# Patient Record
Sex: FEMALE | Race: WHITE | Employment: OTHER | URBAN - METROPOLITAN AREA
[De-identification: names, ages, dates, MRNs, and addresses within clinical notes are randomized per-mention and may not be internally consistent; named-entity substitution may affect disease eponyms.]

---

## 2017-01-23 ENCOUNTER — LAB ENCOUNTER (OUTPATIENT)
Dept: LAB | Facility: HOSPITAL | Age: 82
End: 2017-01-23
Attending: INTERNAL MEDICINE
Payer: MEDICARE

## 2017-01-23 ENCOUNTER — TELEPHONE (OUTPATIENT)
Dept: GASTROENTEROLOGY | Facility: CLINIC | Age: 82
End: 2017-01-23

## 2017-01-23 ENCOUNTER — OFFICE VISIT (OUTPATIENT)
Dept: GASTROENTEROLOGY | Facility: CLINIC | Age: 82
End: 2017-01-23

## 2017-01-23 VITALS
DIASTOLIC BLOOD PRESSURE: 74 MMHG | WEIGHT: 165.38 LBS | HEIGHT: 67 IN | HEART RATE: 59 BPM | SYSTOLIC BLOOD PRESSURE: 136 MMHG | BODY MASS INDEX: 25.96 KG/M2

## 2017-01-23 DIAGNOSIS — R19.7 DIARRHEA, UNSPECIFIED TYPE: ICD-10-CM

## 2017-01-23 DIAGNOSIS — R19.7 DIARRHEA, UNSPECIFIED TYPE: Primary | ICD-10-CM

## 2017-01-23 LAB
ALBUMIN SERPL BCP-MCNC: 3.8 G/DL (ref 3.5–4.8)
ALBUMIN/GLOB SERPL: 1.1 {RATIO} (ref 1–2)
ALP SERPL-CCNC: 80 U/L (ref 32–100)
ALT SERPL-CCNC: 36 U/L (ref 14–54)
ANION GAP SERPL CALC-SCNC: 11 MMOL/L (ref 0–18)
AST SERPL-CCNC: 51 U/L (ref 15–41)
BASOPHILS # BLD: 0 K/UL (ref 0–0.2)
BASOPHILS NFR BLD: 0 %
BILIRUB SERPL-MCNC: 0.6 MG/DL (ref 0.3–1.2)
BUN SERPL-MCNC: 12 MG/DL (ref 8–20)
BUN/CREAT SERPL: 12.9 (ref 10–20)
CALCIUM SERPL-MCNC: 9.5 MG/DL (ref 8.5–10.5)
CHLORIDE SERPL-SCNC: 106 MMOL/L (ref 95–110)
CO2 SERPL-SCNC: 22 MMOL/L (ref 22–32)
CREAT SERPL-MCNC: 0.93 MG/DL (ref 0.5–1.5)
EOSINOPHIL # BLD: 0.3 K/UL (ref 0–0.7)
EOSINOPHIL NFR BLD: 4 %
ERYTHROCYTE [DISTWIDTH] IN BLOOD BY AUTOMATED COUNT: 13.7 % (ref 11–15)
GLOBULIN PLAS-MCNC: 3.5 G/DL (ref 2.5–3.7)
GLUCOSE SERPL-MCNC: 129 MG/DL (ref 70–99)
HCT VFR BLD AUTO: 41.6 % (ref 35–48)
HGB BLD-MCNC: 13.8 G/DL (ref 12–16)
LYMPHOCYTES # BLD: 2.2 K/UL (ref 1–4)
LYMPHOCYTES NFR BLD: 33 %
MCH RBC QN AUTO: 30.3 PG (ref 27–32)
MCHC RBC AUTO-ENTMCNC: 33.2 G/DL (ref 32–37)
MCV RBC AUTO: 91.3 FL (ref 80–100)
MONOCYTES # BLD: 0.6 K/UL (ref 0–1)
MONOCYTES NFR BLD: 8 %
NEUTROPHILS # BLD AUTO: 3.6 K/UL (ref 1.8–7.7)
NEUTROPHILS NFR BLD: 54 %
OSMOLALITY UR CALC.SUM OF ELEC: 289 MOSM/KG (ref 275–295)
PLATELET # BLD AUTO: 196 K/UL (ref 140–400)
PMV BLD AUTO: 9.4 FL (ref 7.4–10.3)
POTASSIUM SERPL-SCNC: 4.4 MMOL/L (ref 3.3–5.1)
PROT SERPL-MCNC: 7.3 G/DL (ref 5.9–8.4)
RBC # BLD AUTO: 4.56 M/UL (ref 3.7–5.4)
SODIUM SERPL-SCNC: 139 MMOL/L (ref 136–144)
WBC # BLD AUTO: 6.6 K/UL (ref 4–11)

## 2017-01-23 PROCEDURE — 36415 COLL VENOUS BLD VENIPUNCTURE: CPT

## 2017-01-23 PROCEDURE — 80053 COMPREHEN METABOLIC PANEL: CPT

## 2017-01-23 PROCEDURE — 99212 OFFICE O/P EST SF 10 MIN: CPT | Performed by: INTERNAL MEDICINE

## 2017-01-23 PROCEDURE — G0463 HOSPITAL OUTPT CLINIC VISIT: HCPCS | Performed by: INTERNAL MEDICINE

## 2017-01-23 PROCEDURE — 99204 OFFICE O/P NEW MOD 45 MIN: CPT | Performed by: INTERNAL MEDICINE

## 2017-01-23 PROCEDURE — 85025 COMPLETE CBC W/AUTO DIFF WBC: CPT

## 2017-01-23 NOTE — PATIENT INSTRUCTIONS
Diarrhea/loose stools x 1 year  - lab work today  - bland diet, avoid dairy  - call us with your medication list  - Colonoscopy with colyte prep and MAC sedation

## 2017-01-23 NOTE — TELEPHONE ENCOUNTER
Scheduled for:  Colonoscopy 29987  Date:  3/9/17  Location:  [facility]  Sedation:  MAC  Time:  0800  Prep:  Colyte  Meds/Allergies Reconciled?:  Yes  Diagnosis with codes:  Diarrhea R19.7  EM or [facility] notified?:  I notified Mel Murphy in Endo to schedule today  Insur

## 2017-01-26 ENCOUNTER — TELEPHONE (OUTPATIENT)
Dept: GASTROENTEROLOGY | Facility: CLINIC | Age: 82
End: 2017-01-26

## 2017-01-26 NOTE — TELEPHONE ENCOUNTER
----- Message from Dayana Vega MD sent at 1/26/2017  3:29 PM CST -----  Lab work looks ok, glucose slightly elevated. Should have the colonoscopy set up as next step.      RN to mail letter to pt

## 2017-01-27 NOTE — PROGRESS NOTES
Deana Desai is a 80year old female.     HPI:   Patient presents with:  Colonoscopy Screening  Diarrhea      The patient is a 28-year-old female with a history of depression, chronic reflux is been experiencing loose stools and diarrhea for the last ye neurologic or dermatologic symptoms. PHYSICAL EXAM:   Blood pressure 136/74, pulse 59, height 5' 7\" (1.702 m), weight 165 lb 6.4 oz (75.025 kg).     The patient appears their stated age and is in no acute distress  HEENT- anicteric sclera, neck no lymp

## 2017-02-07 ENCOUNTER — TELEPHONE (OUTPATIENT)
Dept: GASTROENTEROLOGY | Facility: CLINIC | Age: 82
End: 2017-02-07

## 2017-02-07 NOTE — TELEPHONE ENCOUNTER
No, pt will continue to follow with you. I left another voice message at [address] [place] [address] regarding records. I also notified daughter that I haven't been able to get them yet--she will try to obtain and bring them by the office. Thanks.

## 2017-02-07 NOTE — TELEPHONE ENCOUNTER
Dr Mojgan Cary, I received an email from [facility], stating pt's daughter Nichelle Rosas had called after hours to cancel pt's colonoscopy Mac/diarrhea on Thurs March 9 at [facility].   Pt's diarrhea became severe, she presented to [facility] ER, and was later admitted and h

## 2017-02-09 NOTE — TELEPHONE ENCOUNTER
I spoke to Lacie Scheuermann at [address] [phone] and faxed a face sheet, requesting colon path/op report, ER note, discharge note from pt inpt admission, labs, diagnostics. Faxed it to her at [phone].  Pt 's daughter Bo Kearns notified that they are sendin

## 2017-02-14 NOTE — TELEPHONE ENCOUNTER
Dr Melanie Andres, please refer below. I received records from [facility] and placed on your desk for review. Thanks.

## 2017-03-12 NOTE — TELEPHONE ENCOUNTER
Records from [facility] hospitalization reviewed, CT scan and colonoscopy negative. Stools negative for infection    Please see how the pt is doing.

## 2017-03-13 NOTE — TELEPHONE ENCOUNTER
I spoke to daughter Bo Kearns, who states that as far as she knows, the diarrhea issue has resolved. I instructed her to please contact us if it reoccurred. Thanks.

## 2018-06-13 ENCOUNTER — APPOINTMENT (OUTPATIENT)
Dept: CT IMAGING | Facility: HOSPITAL | Age: 83
End: 2018-06-13
Attending: EMERGENCY MEDICINE
Payer: MEDICARE

## 2018-06-13 ENCOUNTER — HOSPITAL ENCOUNTER (EMERGENCY)
Facility: HOSPITAL | Age: 83
Discharge: HOME | End: 2018-06-14
Attending: EMERGENCY MEDICINE
Payer: MEDICARE

## 2018-06-13 ENCOUNTER — APPOINTMENT (OUTPATIENT)
Dept: GENERAL RADIOLOGY | Facility: HOSPITAL | Age: 83
End: 2018-06-13
Attending: EMERGENCY MEDICINE
Payer: MEDICARE

## 2018-06-13 DIAGNOSIS — N30.00 ACUTE CYSTITIS WITHOUT HEMATURIA: Primary | ICD-10-CM
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
## 2018-06-13 PROCEDURE — 70450 CT HEAD/BRAIN W/O DYE: CPT | Performed by: EMERGENCY MEDICINE

## 2018-06-13 PROCEDURE — 93005 ELECTROCARDIOGRAM TRACING: CPT

## 2018-06-13 PROCEDURE — 85025 COMPLETE CBC W/AUTO DIFF WBC: CPT | Performed by: EMERGENCY MEDICINE

## 2018-06-13 PROCEDURE — 81001 URINALYSIS AUTO W/SCOPE: CPT | Performed by: EMERGENCY MEDICINE

## 2018-06-13 PROCEDURE — 87086 URINE CULTURE/COLONY COUNT: CPT | Performed by: EMERGENCY MEDICINE

## 2018-06-13 PROCEDURE — 72125 CT NECK SPINE W/O DYE: CPT | Performed by: EMERGENCY MEDICINE

## 2018-06-13 PROCEDURE — 99285 EMERGENCY DEPT VISIT HI MDM: CPT

## 2018-06-13 PROCEDURE — 72170 X-RAY EXAM OF PELVIS: CPT | Performed by: EMERGENCY MEDICINE

## 2018-06-13 PROCEDURE — 36415 COLL VENOUS BLD VENIPUNCTURE: CPT

## 2018-06-13 PROCEDURE — 80048 BASIC METABOLIC PNL TOTAL CA: CPT | Performed by: EMERGENCY MEDICINE

## 2018-06-13 PROCEDURE — 93010 ELECTROCARDIOGRAM REPORT: CPT | Performed by: EMERGENCY MEDICINE

## 2018-06-13 PROCEDURE — 87186 SC STD MICRODIL/AGAR DIL: CPT | Performed by: EMERGENCY MEDICINE

## 2018-06-13 PROCEDURE — 87077 CULTURE AEROBIC IDENTIFY: CPT | Performed by: EMERGENCY MEDICINE

## 2018-06-13 RX ORDER — CIPROFLOXACIN 500 MG/1
500 TABLET, FILM COATED ORAL 2 TIMES DAILY
Qty: 14 TABLET | Refills: 0 | Status: SHIPPED | OUTPATIENT
Start: 2018-06-13 | End: 2018-06-20

## 2018-06-13 RX ORDER — LEVOFLOXACIN 5 MG/ML
500 INJECTION, SOLUTION INTRAVENOUS ONCE
Status: DISCONTINUED | OUTPATIENT
Start: 2018-06-13 | End: 2018-06-13

## 2018-06-13 RX ORDER — AMOXICILLIN AND CLAVULANATE POTASSIUM 875; 125 MG/1; MG/1
1 TABLET, FILM COATED ORAL 2 TIMES DAILY
Qty: 14 TABLET | Refills: 0 | Status: SHIPPED | OUTPATIENT
Start: 2018-06-13 | End: 2018-06-13

## 2018-06-13 RX ORDER — LEVOFLOXACIN 500 MG/1
500 TABLET, FILM COATED ORAL ONCE
Status: COMPLETED | OUTPATIENT
Start: 2018-06-13 | End: 2018-06-13

## 2018-06-14 VITALS
WEIGHT: 165.38 LBS | SYSTOLIC BLOOD PRESSURE: 118 MMHG | BODY MASS INDEX: 23.68 KG/M2 | HEART RATE: 62 BPM | DIASTOLIC BLOOD PRESSURE: 64 MMHG | OXYGEN SATURATION: 98 % | RESPIRATION RATE: 14 BRPM | HEIGHT: 70 IN | TEMPERATURE: 98 F

## 2018-06-14 NOTE — ED NOTES
Upon inspection of the patient's posterior surfaces, an approximate 2 cm stage 2 decubitus ulcer was seen on the patient's sacrum.

## 2018-06-14 NOTE — ED PROVIDER NOTES
Patient Seen in: [facility] AND [facility] Emergency Department    History   Patient presents with:  Fall (musculoskeletal, neurologic)    Stated Complaint: Unwitnessed fall, posterior head pain    HPI    43-year-old female patient presents emergency department 97 % [06/13/18 2117]  O2 Device: None (Room air) [06/13/18 2117]    Current:/62   Pulse 61   Temp 97.7 °F (36.5 °C) (Oral)   Resp 15   Ht 177.8 cm (5' 10\")   Wt 75 kg   SpO2 96%   BMI 23.72 kg/m²         Physical Exam    Gen: Awake alert in no appar -----------         ------                     CBC W/ DIFFERENTIAL[[id number]]          Abnormal            Final result                 Please view results for these tests on the individual orders.    [employer]

## 2018-06-14 NOTE — ED INITIAL ASSESSMENT (HPI)
The patient arrived via EMS from [facility] [address] following an unwitnessed fall. The patient is oriented to self only with a history of Alzheimer's disease. The patient currently complains of mild posterior head and neck pain.  The patient arrived with

## 2018-06-14 NOTE — ED NOTES
RN to RN report given to Alyssia Hernandez at [address] for continuation of care. The patient is cleared for discharge per Emergency Department provider.   Discharge instructions reviewed Alyssia Hernandez RN including when and how to follow up with healthcare provid

## 2018-08-11 ENCOUNTER — HOSPITAL ENCOUNTER (EMERGENCY)
Facility: HOSPITAL | Age: 83
Discharge: HOME | End: 2018-08-12
Attending: EMERGENCY MEDICINE
Payer: MEDICARE

## 2018-08-11 ENCOUNTER — APPOINTMENT (OUTPATIENT)
Dept: CT IMAGING | Facility: HOSPITAL | Age: 83
End: 2018-08-11
Attending: EMERGENCY MEDICINE
Payer: MEDICARE

## 2018-08-11 DIAGNOSIS — S16.1XXA STRAIN OF NECK MUSCLE, INITIAL ENCOUNTER: ICD-10-CM

## 2018-08-11 DIAGNOSIS — S00.93XA CONTUSION OF HEAD, UNSPECIFIED PART OF HEAD, INITIAL ENCOUNTER: Primary | ICD-10-CM

## 2018-08-11 DIAGNOSIS — S40.012A CONTUSION OF LEFT SHOULDER, INITIAL ENCOUNTER: ICD-10-CM

## 2018-08-11 DIAGNOSIS — S80.02XA CONTUSION OF LEFT KNEE, INITIAL ENCOUNTER: ICD-10-CM

## 2018-08-11 PROCEDURE — 70450 CT HEAD/BRAIN W/O DYE: CPT | Performed by: EMERGENCY MEDICINE

## 2018-08-11 PROCEDURE — 99285 EMERGENCY DEPT VISIT HI MDM: CPT

## 2018-08-12 ENCOUNTER — APPOINTMENT (OUTPATIENT)
Dept: GENERAL RADIOLOGY | Facility: HOSPITAL | Age: 83
End: 2018-08-12
Attending: EMERGENCY MEDICINE
Payer: MEDICARE

## 2018-08-12 ENCOUNTER — APPOINTMENT (OUTPATIENT)
Dept: CT IMAGING | Facility: HOSPITAL | Age: 83
End: 2018-08-12
Attending: EMERGENCY MEDICINE
Payer: MEDICARE

## 2018-08-12 VITALS
WEIGHT: 160 LBS | RESPIRATION RATE: 18 BRPM | OXYGEN SATURATION: 99 % | SYSTOLIC BLOOD PRESSURE: 128 MMHG | BODY MASS INDEX: 26.66 KG/M2 | HEART RATE: 62 BPM | HEIGHT: 65 IN | TEMPERATURE: 98 F | DIASTOLIC BLOOD PRESSURE: 63 MMHG

## 2018-08-12 PROCEDURE — 72125 CT NECK SPINE W/O DYE: CPT | Performed by: EMERGENCY MEDICINE

## 2018-08-12 PROCEDURE — 73560 X-RAY EXAM OF KNEE 1 OR 2: CPT | Performed by: EMERGENCY MEDICINE

## 2018-08-12 PROCEDURE — 73030 X-RAY EXAM OF SHOULDER: CPT | Performed by: EMERGENCY MEDICINE

## 2018-08-12 NOTE — ED NOTES
Report given to Ernestine Chase RN at [facility] and informed that patient will be returning to [facility].

## 2018-08-12 NOTE — ED INITIAL ASSESSMENT (HPI)
Marc Vences arrive in ER per [facility] from the [facility] d/t unwitnessed fall, as per ems pt was found on the floor by Nursing Home staff, pt c/o headache/neck pain+back pain, pt not on blood thinner

## 2018-08-12 NOTE — ED PROVIDER NOTES
Patient Seen in: [facility] AND [facility] Emergency Department    History   Patient presents with:  Fall (musculoskeletal, neurologic)    Stated Complaint: s/p fall, neck/back pain,no loc    HPI    Patient complains of mechanical fall that occurred  At [facility].   Sta reviewed and negative except as noted above. PSFH elements reviewed from today and agreed except as otherwise stated in HPI.     Physical Exam   ED Triage Vitals [08/11/18 2309]  BP: 143/64  Pulse: 64  Resp: 18  Temp: 98.3 °F (36.8 °C)  Temp src: Oral  S without major discrepancies. Dictated by (CST): Andrew Bowen MD on 8/12/2018 at 6:53     Approved by (CST): Andrew Bowen MD on 8/12/2018 at 6:57          Ct Brain Or Head ([id number])    Result Date: 8/12/2018  CONCLUSION:  1.   No acute intracrania of left shoulder, initial encounter  Contusion of left knee, initial encounter    Disposition:  Discharge    Follow-up:  Flora Cassidy 21             Medications Prescribed:  Discharge Medication List as

## 2018-10-25 ENCOUNTER — HOSPITAL ENCOUNTER (EMERGENCY)
Facility: HOSPITAL | Age: 83
Discharge: HOME | End: 2018-10-25
Attending: EMERGENCY MEDICINE
Payer: MEDICARE

## 2018-10-25 VITALS
TEMPERATURE: 98 F | HEART RATE: 69 BPM | HEIGHT: 60 IN | DIASTOLIC BLOOD PRESSURE: 60 MMHG | SYSTOLIC BLOOD PRESSURE: 138 MMHG | RESPIRATION RATE: 20 BRPM | OXYGEN SATURATION: 98 % | BODY MASS INDEX: 26.84 KG/M2 | WEIGHT: 136.69 LBS
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
## 2018-10-25 PROCEDURE — 99284 EMERGENCY DEPT VISIT MOD MDM: CPT

## 2018-10-25 RX ORDER — DIVALPROEX SODIUM 500 MG/1
500 TABLET, DELAYED RELEASE ORAL 3 TIMES DAILY
COMMUNITY

## 2018-10-25 RX ORDER — PANTOPRAZOLE SODIUM 40 MG/1
40 TABLET, DELAYED RELEASE ORAL
COMMUNITY

## 2018-10-25 RX ORDER — LOPERAMIDE HYDROCHLORIDE 2 MG/1
2 CAPSULE ORAL 4 TIMES DAILY PRN
COMMUNITY

## 2018-10-25 RX ORDER — AMLODIPINE BESYLATE 5 MG/1
5 TABLET ORAL DAILY
Status: ON HOLD | COMMUNITY
End: 2020-10-29

## 2018-10-25 RX ORDER — LEVOTHYROXINE SODIUM 0.05 MG/1
100 TABLET ORAL
COMMUNITY

## 2018-10-25 RX ORDER — METOPROLOL SUCCINATE 50 MG/1
50 TABLET, EXTENDED RELEASE ORAL DAILY
Status: ON HOLD | COMMUNITY
End: 2020-10-29

## 2018-10-26 NOTE — ED NOTES
Report given to [facility] from the [facility]. Discussed POC. No further questions.

## 2018-10-26 NOTE — ED PROVIDER NOTES
Patient Seen in: [facility] AND [facility] Emergency Department    History   Patient presents with:  Fall (musculoskeletal, neurologic)      HPI    Patient presents to the ED after an unwitnessed fall at the [facility] in [place].  Patient fell around 7 file      Transportation needs - medical: Not on file      Transportation needs - non-medical: Not on file    Occupational History      Not on file    Tobacco Use      Smoking status: Former Smoker      Smokeless tobacco: Never Used    Substance and Sexual Psychiatric: She has a normal mood and affect. Her behavior is normal.   Nursing note and vitals reviewed.       ED Course      Labs Reviewed - No data to display      Imaging Results Available and Reviewed while in ED:     ED Medications Administered: Me

## 2018-10-26 NOTE — ED NOTES
[facility] ambulance arrived for patient transport. Discharge instructions given to team. POC discussed. No further questions.

## 2018-11-20 ENCOUNTER — OFFICE VISIT (OUTPATIENT)
Dept: SURGERY | Facility: CLINIC | Age: 83
End: 2018-11-20
Payer: COMMERCIAL

## 2018-11-20 VITALS
HEIGHT: 67 IN | TEMPERATURE: 99 F | DIASTOLIC BLOOD PRESSURE: 60 MMHG | BODY MASS INDEX: 25.9 KG/M2 | HEART RATE: 71 BPM | SYSTOLIC BLOOD PRESSURE: 110 MMHG | WEIGHT: 165 LBS

## 2018-11-20 DIAGNOSIS — Z97.8 FOLEY CATHETER IN PLACE: Primary | ICD-10-CM

## 2018-11-20 DIAGNOSIS — R33.9 URINARY RETENTION: ICD-10-CM

## 2018-11-20 PROCEDURE — G0463 HOSPITAL OUTPT CLINIC VISIT: HCPCS | Performed by: NURSE PRACTITIONER

## 2018-11-20 PROCEDURE — 99203 OFFICE O/P NEW LOW 30 MIN: CPT | Performed by: NURSE PRACTITIONER

## 2018-11-20 NOTE — PROGRESS NOTES
HPI:    Patient ID: Dm Patino is a 80year old female. Patient is a 80year old female who presents to the clinic today for a consult regarding urinary retention. She is here today with her daughter.   Per daughter, she was found to have urinary breakfast. Disp:  Rfl:    Pantoprazole Sodium 40 MG Oral Tab EC Take 40 mg by mouth every morning before breakfast. Disp:  Rfl:    Metoprolol Succinate ER 50 MG Oral Tablet 24 Hr Take 50 mg by mouth daily.  Disp:  Rfl:    ATENOLOL 25 MG OR TABS 1 TABLET SARA rhythm. No murmur heard. Pulmonary/Chest: Effort normal and breath sounds normal. No respiratory distress. Abdominal: Soft. Bowel sounds are normal. There is no tenderness.    Genitourinary:   Genitourinary Comments: No CVA tenderness  16 fr smith cath

## 2019-06-24 ENCOUNTER — TELEPHONE (OUTPATIENT)
Dept: SURGERY | Facility: CLINIC | Age: 84
End: 2019-06-24

## 2020-10-23 ENCOUNTER — APPOINTMENT (OUTPATIENT)
Dept: MRI IMAGING | Facility: HOSPITAL | Age: 85
DRG: 177 | End: 2020-10-23
Attending: EMERGENCY MEDICINE
Payer: MEDICARE

## 2020-10-23 ENCOUNTER — HOSPITAL ENCOUNTER (INPATIENT)
Facility: HOSPITAL | Age: 85
LOS: 6 days | Discharge: NURSING, REHAB OR LONG-TERM CARE | DRG: 177 | End: 2020-10-29
Attending: EMERGENCY MEDICINE | Admitting: INTERNAL MEDICINE
Payer: MEDICARE

## 2020-10-23 ENCOUNTER — APPOINTMENT (OUTPATIENT)
Dept: CT IMAGING | Facility: HOSPITAL | Age: 85
DRG: 177 | End: 2020-10-23
Attending: EMERGENCY MEDICINE
Payer: MEDICARE

## 2020-10-23 ENCOUNTER — TELEPHONE (OUTPATIENT)
Dept: RESPIRATORY THERAPY | Facility: HOSPITAL | Age: 85
End: 2020-10-23

## 2020-10-23 ENCOUNTER — APPOINTMENT (OUTPATIENT)
Dept: GENERAL RADIOLOGY | Facility: HOSPITAL | Age: 85
DRG: 177 | End: 2020-10-23
Attending: EMERGENCY MEDICINE
Payer: MEDICARE

## 2020-10-23 DIAGNOSIS — R29.810 FACIAL DROOP: ICD-10-CM

## 2020-10-23 DIAGNOSIS — N30.01 ACUTE CYSTITIS WITH HEMATURIA: Primary | ICD-10-CM

## 2020-10-23 PROCEDURE — 92610 EVALUATE SWALLOWING FUNCTION: CPT

## 2020-10-23 PROCEDURE — 80053 COMPREHEN METABOLIC PANEL: CPT | Performed by: EMERGENCY MEDICINE

## 2020-10-23 PROCEDURE — 93005 ELECTROCARDIOGRAM TRACING: CPT

## 2020-10-23 PROCEDURE — 81001 URINALYSIS AUTO W/SCOPE: CPT | Performed by: EMERGENCY MEDICINE

## 2020-10-23 PROCEDURE — 99285 EMERGENCY DEPT VISIT HI MDM: CPT

## 2020-10-23 PROCEDURE — 85025 COMPLETE CBC W/AUTO DIFF WBC: CPT | Performed by: EMERGENCY MEDICINE

## 2020-10-23 PROCEDURE — 70450 CT HEAD/BRAIN W/O DYE: CPT | Performed by: EMERGENCY MEDICINE

## 2020-10-23 PROCEDURE — 87040 BLOOD CULTURE FOR BACTERIA: CPT | Performed by: EMERGENCY MEDICINE

## 2020-10-23 PROCEDURE — 87086 URINE CULTURE/COLONY COUNT: CPT | Performed by: EMERGENCY MEDICINE

## 2020-10-23 PROCEDURE — 82962 GLUCOSE BLOOD TEST: CPT

## 2020-10-23 PROCEDURE — 93010 ELECTROCARDIOGRAM REPORT: CPT | Performed by: EMERGENCY MEDICINE

## 2020-10-23 PROCEDURE — 96365 THER/PROPH/DIAG IV INF INIT: CPT

## 2020-10-23 PROCEDURE — 83605 ASSAY OF LACTIC ACID: CPT | Performed by: EMERGENCY MEDICINE

## 2020-10-23 PROCEDURE — 71045 X-RAY EXAM CHEST 1 VIEW: CPT | Performed by: EMERGENCY MEDICINE

## 2020-10-23 PROCEDURE — 87641 MR-STAPH DNA AMP PROBE: CPT | Performed by: EMERGENCY MEDICINE

## 2020-10-23 PROCEDURE — 70551 MRI BRAIN STEM W/O DYE: CPT | Performed by: EMERGENCY MEDICINE

## 2020-10-23 PROCEDURE — 36415 COLL VENOUS BLD VENIPUNCTURE: CPT

## 2020-10-23 RX ORDER — LEVOTHYROXINE SODIUM 0.1 MG/1
100 TABLET ORAL
Status: DISCONTINUED | OUTPATIENT
Start: 2020-10-23 | End: 2020-10-29

## 2020-10-23 RX ORDER — SODIUM CHLORIDE 9 MG/ML
INJECTION, SOLUTION INTRAVENOUS CONTINUOUS
Status: DISCONTINUED | OUTPATIENT
Start: 2020-10-23 | End: 2020-10-29

## 2020-10-23 RX ORDER — ACETAMINOPHEN 650 MG/1
650 SUPPOSITORY RECTAL ONCE
Status: COMPLETED | OUTPATIENT
Start: 2020-10-23 | End: 2020-10-23

## 2020-10-23 RX ORDER — ACETAMINOPHEN 325 MG/1
650 TABLET ORAL EVERY 6 HOURS PRN
Status: DISCONTINUED | OUTPATIENT
Start: 2020-10-23 | End: 2020-10-29

## 2020-10-23 RX ORDER — ACETAMINOPHEN 325 MG/1
325 TABLET ORAL EVERY 6 HOURS PRN
COMMUNITY

## 2020-10-23 RX ORDER — VANCOMYCIN HYDROCHLORIDE 125 MG/1
125 CAPSULE ORAL DAILY
Status: DISCONTINUED | OUTPATIENT
Start: 2020-10-23 | End: 2020-10-23

## 2020-10-23 RX ORDER — ESCITALOPRAM OXALATE 10 MG/1
5 TABLET ORAL DAILY
Status: DISCONTINUED | OUTPATIENT
Start: 2020-10-23 | End: 2020-10-29

## 2020-10-23 RX ORDER — DIVALPROEX SODIUM 125 MG/1
125 TABLET, DELAYED RELEASE ORAL 2 TIMES DAILY
Status: DISCONTINUED | OUTPATIENT
Start: 2020-10-23 | End: 2020-10-29

## 2020-10-23 RX ORDER — PANTOPRAZOLE SODIUM 40 MG/1
40 TABLET, DELAYED RELEASE ORAL
Status: DISCONTINUED | OUTPATIENT
Start: 2020-10-24 | End: 2020-10-29

## 2020-10-23 RX ORDER — DIVALPROEX SODIUM 125 MG/1
125 TABLET, DELAYED RELEASE ORAL 2 TIMES DAILY
COMMUNITY

## 2020-10-23 RX ORDER — ACETAMINOPHEN 325 MG/1
650 TABLET ORAL EVERY 6 HOURS PRN
Status: DISCONTINUED | OUTPATIENT
Start: 2020-10-23 | End: 2020-10-24

## 2020-10-23 RX ORDER — METOPROLOL TARTRATE 50 MG/1
50 TABLET, FILM COATED ORAL 2 TIMES DAILY
COMMUNITY

## 2020-10-23 RX ORDER — ENOXAPARIN SODIUM 100 MG/ML
40 INJECTION SUBCUTANEOUS DAILY
Status: DISCONTINUED | OUTPATIENT
Start: 2020-10-23 | End: 2020-10-29

## 2020-10-23 RX ORDER — METOPROLOL TARTRATE 50 MG/1
50 TABLET, FILM COATED ORAL 2 TIMES DAILY
Status: DISCONTINUED | OUTPATIENT
Start: 2020-10-23 | End: 2020-10-29

## 2020-10-23 RX ORDER — ATORVASTATIN CALCIUM 10 MG/1
10 TABLET, FILM COATED ORAL NIGHTLY
Status: DISCONTINUED | OUTPATIENT
Start: 2020-10-23 | End: 2020-10-29

## 2020-10-23 RX ORDER — ACETAMINOPHEN 650 MG/1
SUPPOSITORY RECTAL
Status: COMPLETED
Start: 2020-10-23 | End: 2020-10-23

## 2020-10-23 RX ORDER — ALBUTEROL SULFATE 2.5 MG/3ML
2.5 SOLUTION RESPIRATORY (INHALATION) EVERY 6 HOURS PRN
Status: DISCONTINUED | OUTPATIENT
Start: 2020-10-23 | End: 2020-10-29

## 2020-10-23 NOTE — ED PROVIDER NOTES
Patient Seen in: [facility] AND [facility] Emergency Department      History   No chief complaint on file. Stated Complaint:     HPI  History is provided by EMS. 80-year-old female brought in by EMS for facial droop noticed by staff at the nursing home.   L 10/23/20 0409 (!) 170/81   Pulse 10/23/20 0409 112   Resp 10/23/20 0409 20   Temp 10/23/20 0446 (!) 102.3 °F (39.1 °C)   Temp src 10/23/20 0446 Rectal   SpO2 10/23/20 0409 91 %   O2 Device 10/23/20 0409 None (Room air)       Current:/49   Pulse 90 10/23/20  4:15 AM   Result Value Ref Range    Glucose 171 (H) 70 - 99 mg/dL    Sodium 143 136 - 145 mmol/L    Potassium 4.3 3.5 - 5.1 mmol/L    Chloride 111 98 - 112 mmol/L    CO2 24.0 21.0 - 32.0 mmol/L    Anion Gap 8 0 - 18 mmol/L    BUN 21 (H) 7 - 18 mg W/ DIFFERENTIAL    Collection Time: 10/23/20  4:15 AM   Result Value Ref Range    WBC 13.3 (H) 4.0 - 11.0 x10(3) uL    RBC 4.49 3.80 - 5.30 x10(6)uL    HGB 13.6 12.0 - 16.0 g/dL    HCT 41.0 35.0 - 48.0 %    MCV 91.3 80.0 - 100.0 fL    MCH 30.3 26.0 - 34.0 this examination. This final report agrees with their preliminary findings.    Dictated by (CST): Ike Ordoñez MD on 10/23/2020 at 6:11 AM     Finalized by (CST): Ike Ordoñez MD on 10/23/2020 at 6:12 AM          CT head without contrast    Comparison: 8/11 treating for pneumonia  - discussed with pt's daughter - states she does not know why the POLST form would say she does not want her mother transferred - she would like her treated and admitted to the hospital - she also reports when she spoke with her mot

## 2020-10-23 NOTE — SLP NOTE
ADULT SWALLOWING EVALUATION    ASSESSMENT    ASSESSMENT/OVERALL IMPRESSION:  Orders received for bedside swallow evaluation. Patient admitted with acute cystitis and hematuria. History of Alzheimer's disease. Admitted from the NH.  According to paperwork fr therapy; Aspiration precautions(meal monitor)  Discharge Recommendations/Plan: Undetermined    HISTORY   MEDICAL HISTORY  Reason for Referral: Stroke protocol    Problem List  Principal Problem:    Acute cystitis with hematuria  Active Problems:    Facial d Weak  Respiratory Status: Unlabored  Consistencies Trialed: Thin liquids;Puree; Soft solid  Method of Presentation: Staff/Clinician assistance;Spoon;Cup;Straw;Single sips; Consecutive swallows  Patient Positioning: Upright;Midline(in bed)    Oral Phase of Sw

## 2020-10-23 NOTE — ED NOTES
Orders for admission, patient is aware of plan and ready to go upstairs.  Any questions, please call ED ERIKA cage at extension [phone]  Type of COVID test sent: rapid   COVID Suspicion level: Low    Titratable drug(s) infusing:  Rate:    LOC at time of transp

## 2020-10-23 NOTE — PLAN OF CARE
Nancy John is admitted to 4se instable condition. Patient appears to be alert and oriented x2. On 1L of NC. Rocephin given for UTI. Pressure ulcer to sacrum traced and aquacel placed. Droplet precautions maintained as patient is from the [facility].      Problem: Sulma Garcia

## 2020-10-23 NOTE — ED INITIAL ASSESSMENT (HPI)
EMS states that the pt is a active DNR. NH called the patients family and stated that they wanted pt transported to hospital.  Pt was sent because the pt was not responding and had a Lt facial droop.   No other complaints voiced

## 2020-10-24 PROCEDURE — 80048 BASIC METABOLIC PNL TOTAL CA: CPT | Performed by: INTERNAL MEDICINE

## 2020-10-24 PROCEDURE — 85027 COMPLETE CBC AUTOMATED: CPT | Performed by: INTERNAL MEDICINE

## 2020-10-24 PROCEDURE — 92526 ORAL FUNCTION THERAPY: CPT

## 2020-10-24 RX ORDER — ACETAMINOPHEN 325 MG/1
650 TABLET ORAL EVERY 4 HOURS PRN
Status: DISCONTINUED | OUTPATIENT
Start: 2020-10-24 | End: 2020-10-29

## 2020-10-24 NOTE — SLP NOTE
SPEECH DAILY NOTE - INPATIENT    ASSESSMENT & PLAN   ASSESSMENT  PPE REQUIRED. THIS SLP WORE GOWN, GOGGLES, GLOVES, AND DROPLET MASK. HANDS SANITIZED/WASHED UPON ENTRANCE/EXIT.      Pt seen to monitor tolerance of PO diet, train compensatory strategies and thin liquids via pinched straw. In Progress   Goal #2 The patient/family/caregiver will demonstrate understanding and implementation of aspiration precautions and swallow strategies independently over 1-2 session(s). Family not available.   Provided cue

## 2020-10-24 NOTE — PLAN OF CARE
Patient is alert to self and place at baseline, on 1L of O2 nc, turn and reposition. IVFs infusing through R-hand IV. Rocephin as ordered for UTI. Meds crushed in apple sauce. Tolerating general pureed diet - patient needs assistance with feedings.  Dressin

## 2020-10-24 NOTE — PROGRESS NOTES
[facility]    Progress Note    Kristen Thomson Patient Status:  Inpatient    1933 MRN [id number]   Location [facility] 4W/SW/SE Attending Nati Soto, *   Hosp Day # 1 PCP Freddy Tolliver MD       SUBJECT mg, 5 mg, Oral, Daily    •  atorvastatin (LIPITOR) tab 10 mg, 10 mg, Oral, Nightly    •  Metoprolol Tartrate (LOPRESSOR) tab 50 mg, 50 mg, Oral, BID    •  Pantoprazole Sodium (PROTONIX) EC tab 40 mg, 40 mg, Oral, QAM AC    •  Enoxaparin Sodium (LOVENOX) 40

## 2020-10-24 NOTE — H&P
[address] Patient Status:  Inpatient    1933 MRN [id number]   Location [facility] 4W/SW/SE Attending Dada Miles, *   Hosp Day # 1 PCP Katherin Velásquez MD     Date: History:  Social History    Tobacco Use      Smoking status: Former Smoker      Smokeless tobacco: Never Used    Alcohol use: No      Alcohol/week: 0.0 standard drinks      Comment: formerly    Drug use: No    Allergies/Medications:    Allergies:   Vicodin rhythm  Abdominal: soft, non-tender; bowel sounds normal; no masses,  no organomegaly  Extremities: extremities normal, atraumatic, no cyanosis or edema  Neurologic: Grossly normal      Results:     Lab Results   Component Value Date    WBC 13.3 (H) 10/23/ microvascular white matter ischemic changes, likely related to long-standing hypertension and/or diabetes.    Dictated by (CST): Leonidas Walker MD on 10/23/2020 at 7:15 AM     Finalized by (CST): Leonidas Walker MD on 10/23/2020 at 7:16 AM          Ekg 12-lead

## 2020-10-24 NOTE — PLAN OF CARE
Problem: Patient Centered Care  Goal: Patient preferences are identified and integrated in the patient's plan of care  Description: Interventions:  - What would you like us to know as we care for you?  From nursing home  - Provide timely, complete, and ac Progressing  10/24/2020 1327 by Linh Sesay, RN  Outcome: Progressing     Problem: PAIN - ADULT  Goal: Verbalizes/displays adequate comfort level or patient's stated pain goal  Description: INTERVENTIONS:  - Encourage pt to monitor pain and request ass system  Outcome: Progressing     Plan of care followed. Medications given with apple sauce, tolerated well. Minimal appetite, assisted with feeding by staff. Repositioned in bed frequently. Dressing on sacrum  Pt.  Had elevated temperature, tylenol give

## 2020-10-25 ENCOUNTER — APPOINTMENT (OUTPATIENT)
Dept: GENERAL RADIOLOGY | Facility: HOSPITAL | Age: 85
DRG: 177 | End: 2020-10-25
Attending: INTERNAL MEDICINE
Payer: MEDICARE

## 2020-10-25 PROCEDURE — 97530 THERAPEUTIC ACTIVITIES: CPT

## 2020-10-25 PROCEDURE — 80048 BASIC METABOLIC PNL TOTAL CA: CPT | Performed by: INTERNAL MEDICINE

## 2020-10-25 PROCEDURE — 71045 X-RAY EXAM CHEST 1 VIEW: CPT | Performed by: INTERNAL MEDICINE

## 2020-10-25 PROCEDURE — 97166 OT EVAL MOD COMPLEX 45 MIN: CPT

## 2020-10-25 NOTE — DIETARY NOTE
ADULT NUTRITION INITIAL ASSESSMENT    Pt is at moderate nutrition risk. Pt does not meet malnutrition criteria.       RECOMMENDATIONS TO MD:  CPM     NUTRITION DIAGNOSIS/PROBLEM:  Increased nutrient needs related to increased demand for calories/protein to Osteoporosis, unspecified    • Thyroid disease      ANTHROPOMETRICS:  HT: 170.2 cm (5' 7\")  WT: 72.6 kg (160 lb)   BMI: Body mass index is 25.06 kg/m².   BMI CLASSIFICATION: 25-29.9 kg/m2 - overweight  IBW: 135 lbs        118% IBW  Usual Body Wt: 165 lbs did not conduct NFPE d/t isolation precautions at this time. Initiated ONS with all meals. Noted DNR--Comfort Focus.    - Fluid Accumulation: none per RN documentation     - Skin Integrity: breakdown and RN documentation reviewed.  Sacrum DTI -- Wound is

## 2020-10-25 NOTE — OCCUPATIONAL THERAPY NOTE
OCCUPATIONAL THERAPY EVALUATION - INPATIENT     Room Number: 469/469-A  Evaluation Date: 10/25/2020  Type of Evaluation: Initial  Presenting Problem: (Facial Droop, UTI)    Physician Order: IP Consult to Occupational Therapy  Reason for Therapy: ADL/IADL limitation presents during this admission.     OCCUPATIONAL THERAPY MEDICAL/SOCIAL HISTORY     Problem List  Principal Problem:    Acute cystitis with hematuria  Active Problems:    Facial droop      Past Medical History  Past Medical History:   Diagnosis D limits     STRENGTH ASSESSMENT  Upper extremity strength is within functional limits     COORDINATION  Gross Motor: WFL   Fine Motor: WFL          ACTIVITIES OF DAILY LIVING ASSESSMENT  AM-PAC ‘6-Clicks’ Inpatient Daily Activity Short Form  How much help f

## 2020-10-25 NOTE — PLAN OF CARE
Problem: Patient Centered Care  Goal: Patient preferences are identified and integrated in the patient's plan of care  Description: Interventions:  - What would you like us to know as we care for you?  From nursing home  - Provide timely, complete, and ac non-pharmacological measures as appropriate and evaluate response  - Consider cultural and social influences on pain and pain management  - Manage/alleviate anxiety  - Utilize distraction and/or relaxation techniques  - Monitor for opioid side effects  - N to prevent infection transmission during my interaction with the patient were taken.  Hand hygiene was performed prior to and after the exam.  Stethoscope and any equipment used during my examination were cleaned following the exam.

## 2020-10-25 NOTE — PROGRESS NOTES
[facility]    Progress Note    Floresita Guthrie Patient Status:  Inpatient    1933 MRN [id number]   Location [facility] 4W/SW/SE Attending Leanna Tierney, *   Hosp Day # 2 PCP Ashlyn Carpenter MD       SUBJECT infusion, , Intravenous, Continuous    •  cefTRIAXone Sodium (ROCEPHIN) 1 g in sodium chloride 0.9% 100 mL MBP/ADD-vantage, 1 g, Intravenous, Q24H    •  acetaminophen (TYLENOL) tab 650 mg, 650 mg, Oral, Q6H PRN    •  divalproex Sodium (DEPAKOTE)  tab 125

## 2020-10-26 PROCEDURE — 80048 BASIC METABOLIC PNL TOTAL CA: CPT | Performed by: INTERNAL MEDICINE

## 2020-10-26 PROCEDURE — 92526 ORAL FUNCTION THERAPY: CPT

## 2020-10-26 PROCEDURE — 85027 COMPLETE CBC AUTOMATED: CPT | Performed by: INTERNAL MEDICINE

## 2020-10-26 NOTE — PLAN OF CARE
Patient is alert but oriented only to self. Vital signs stable. Fever overnight. PRN tylenol and ice packs plaaced. Tolerating general pureed diet. Patient comfortable in bed. Turned Q2 hours. Bedfast. Fragoso in place. IVF infusing.  Rocephin for antibiotic pt/family on patient safety including physical limitations  - Instruct pt to call for assistance with activity based on assessment  - Modify environment to reduce risk of injury  - Provide assistive devices as appropriate  - Consider OT/PT consult to kelly social support system  Outcome: Progressing

## 2020-10-26 NOTE — PLAN OF CARE
Pt A/O to self. 2L of O2, sating 90-91%. Poor appetite. Fragoso intact & draining. IVF & IV abx continued. Afebrile throughout shift. Turning as tolerated. Isolation precautions maintained. Fall precautions in place. Call light within reach.  Frequent roundin on patient safety including physical limitations  - Instruct pt to call for assistance with activity based on assessment  - Modify environment to reduce risk of injury  - Provide assistive devices as appropriate  - Consider OT/PT consult to assist with str managing their own health  - Refer to Case Management Department for coordinating discharge planning if the patient needs post-hospital services based on physician/LIP order or complex needs related to functional status, cognitive ability or social support

## 2020-10-26 NOTE — PHYSICAL THERAPY NOTE
Attempted to see patient for physical therapy evaluation. Patient appears to be at baseline, per RN she lives at the [facility] and is dependent for all cares there, as well as lift for transfer. Patient not appropriate for skilled physical therapy.  Will acknow

## 2020-10-26 NOTE — SLP NOTE
SPEECH DAILY NOTE - INPATIENT    ASSESSMENT & PLAN   ASSESSMENT    PPE REQUIRED. THIS THERAPIST WORE GLOVES, GOWN, GOGGLES, AND DROPLET MASK FOR DURATION OF TX SESSION. HANDS WASHED UPON ENTRANCE/EXIT.      Speech therapy completed for ongoing meal assessme coarsened bronchovascular markings are apparent. Basilar reticular opacities may reflect atelectasis. No airspace consolidation, pleural effusion,   or pneumothorax is evident. PLAN: SLP sign off case secondary to pt tolerating least restrictive diet. pinched straws, Feed patient with mod assistance 95 % of the time across 2 sessions. Pt was fed by the SLP following safe swallowing strategies. Pt continues to tolerate pinched straw with no overt clinical signs of aspiration.   RN reports staff is feed

## 2020-10-26 NOTE — PROGRESS NOTES
[facility]    Progress Note    Belenda Clear Patient Status:  Inpatient    1933 MRN [id number]   Location [facility] 4W/SW/SE Attending Cam Flavors, *   Hosp Day # 3 PCP Ne Rai MD       SUBJECT (DEPAKOTE)  tab 125 mg, 125 mg, Oral, BID    •  Levothyroxine Sodium (SYNTHROID) tab 100 mcg, 100 mcg, Oral, Before breakfast    •  escitalopram (LEXAPRO) tablet 5 mg, 5 mg, Oral, Daily    •  atorvastatin (LIPITOR) tab 10 mg, 10 mg, Oral, Nightly    •  M

## 2020-10-26 NOTE — CM/SW NOTE
10/26/20 1100   CM/SW Screening   Referral Source Social Work (self-referral)   [facility] staff; Chart review;Nursing rounds   Patient's [address] [facility] Name   ([facility])   Patient lives with Aneudy

## 2020-10-27 NOTE — PAYOR COMM NOTE
--------------  Appropriate for inpatient status per guidelines for facial droop, weakness, lethargy and fever due to UTI and PNA.        ADMISSION REVIEW     Payor: [address] [place] [address] #:  [id number]  Authorization Number: [id number] as noted above.     Physical Exam     ED Triage Vitals   BP 10/23/20 0409 (!) 170/81   Pulse 10/23/20 0409 112   Resp 10/23/20 0409 20   Temp 10/23/20 0446 (!) 102.3 °F (39.1 °C)   Temp src 10/23/20 0446 Rectal   SpO2 10/23/20 0409 91 %   O2 Device 10/23/20 Detected   COMP METABOLIC PANEL (14)    Collection Time: 10/23/20  4:15 AM   Result Value Ref Range    Glucose 171 (H) 70 - 99 mg/dL    Sodium 143 136 - 145 mmol/L    Potassium 4.3 3.5 - 5.1 mmol/L    Chloride 111 98 - 112 mmol/L    CO2 24.0 21.0 - 32.0 mm Result Value Ref Range    Hold Gold Auto Resulted    CBC W/ DIFFERENTIAL    Collection Time: 10/23/20  4:15 AM   Result Value Ref Range    WBC 13.3 (H) 4.0 - 11.0 x10(3) uL    RBC 4.49 3.80 - 5.30 x10(6)uL    HGB 13.6 12.0 - 16.0 g/dL    HCT 41.0 35.0 - pneumonia. [facility] provided a preliminary report for this examination. This final report agrees with their preliminary findings.    Dictated by (CST): Charlene Ya MD on 10/23/2020 at 6:11 AM     Finalized by (CST): Charlene Ya MD on 10/23/2020 available prior medical records for any recent pertinent discharge summaries, testing, and procedures, and reviewed those reports. Complicating Factors: The patient already has does not have any pertinent problems on file.  to contribute to the complexit Grossly normal      Results:     Lab Results   Component Value Date    WBC 13.3 (H) 10/23/2020    HGB 13.6 10/23/2020    HCT 41.0 10/23/2020    .0 10/23/2020    CREATSERUM 0.92 10/23/2020    BUN 21 (H) 10/23/2020     10/23/2020    K 4.3 10/23/ last 24 hours:  /56 (BP Location: Right arm)   Pulse 66   Temp 99.2 °F (37.3 °C) (Axillary)   Resp 20   Ht 5' 7\" (1.702 m)   Wt 160 lb (72.6 kg)   SpO2 91%   BMI 25.06 kg/m²       Recent Labs   Lab 10/23/20  0415 10/24/20  0650   RBC 4.49 4.16   HGB 1635 Given 100 mcg Oral Kavita Moss RN      Metoprolol Tartrate (LOPRESSOR) tab 50 mg     Date Action Dose Route User    10/26/2020 2051 Given 50 mg Oral [place]    10/26/2020 1593 Given 50 mg Oral Jamal Sorenson RN      Pantoprazole [place]

## 2020-10-27 NOTE — PLAN OF CARE
Problem: Patient Centered Care  Goal: Patient preferences are identified and integrated in the patient's plan of care  Description: Interventions:  - What would you like us to know as we care for you?  I have dementia  - Provide timely, complete, and accu PAIN - ADULT  Goal: Verbalizes/displays adequate comfort level or patient's stated pain goal  Description: INTERVENTIONS:  - Encourage pt to monitor pain and request assistance  - Assess pain using appropriate pain scale  - Administer analgesics based on t urinary retention  Description: INTERVENTIONS:  - Assess patient’s ability to void and empty bladder  - Monitor intake/output and perform bladder scan as needed  - Follow urinary retention protocol/standard of care  - Consider collaborating with pharmacy t

## 2020-10-27 NOTE — PLAN OF CARE
Patient is alert but oriented only to self. Vital signs stable. Afebrile overnight. Tolerating general pureed diet. Patient comfortable in bed. Turned as tolerated. Bedfast. Fragoso in place. IVF infusing. Zosyn for antibiotic coverage.  Sacral wound measured Educate pt/family on patient safety including physical limitations  - Instruct pt to call for assistance with activity based on assessment  - Modify environment to reduce risk of injury  - Provide assistive devices as appropriate  - Consider OT/PT consult ability or social support system  Outcome: Progressing

## 2020-10-28 PROCEDURE — 85027 COMPLETE CBC AUTOMATED: CPT | Performed by: INTERNAL MEDICINE

## 2020-10-28 PROCEDURE — 99211 OFF/OP EST MAY X REQ PHY/QHP: CPT

## 2020-10-28 PROCEDURE — 80048 BASIC METABOLIC PNL TOTAL CA: CPT | Performed by: INTERNAL MEDICINE

## 2020-10-28 RX ORDER — POTASSIUM CHLORIDE 20 MEQ/1
40 TABLET, EXTENDED RELEASE ORAL ONCE
Status: COMPLETED | OUTPATIENT
Start: 2020-10-28 | End: 2020-10-28

## 2020-10-28 NOTE — PLAN OF CARE
Patient is alert but oriented only to self. Vital signs stable. Afebrile overnight. Tolerating general pureed diet. Patient comfortable in bed. Turned q2 hours. Bedfast. Fragoso in place. IVF infusing. Zosyn for antibiotic coverage.  Sacral wound dressing cash pt/family on patient safety including physical limitations  - Instruct pt to call for assistance with activity based on assessment  - Modify environment to reduce risk of injury  - Provide assistive devices as appropriate  - Consider OT/PT consult to kelly social support system  Outcome: Progressing

## 2020-10-28 NOTE — CM/SW NOTE
10/28: Patient discussed in rounds, likely discharged back to [facility] Thursday. Updates sent via [facility], message left for admissions. Addendum 10/29/2020:    Received d/c order. L/m for Shlomo Cerda requesting bed.  [place] [id number] [facility]

## 2020-10-28 NOTE — PROGRESS NOTES
[facility]    Progress Note    Darío Morales Patient Status:  Inpatient    1933 MRN [id number]   Location [facility] 4W/SW/SE Attending Terese Medeiros, *   Hosp Day # 5 PCP Radha Harrington MD       SUBJECT Continuous    •  acetaminophen (TYLENOL) tab 650 mg, 650 mg, Oral, Q6H PRN    •  divalproex Sodium (DEPAKOTE) DR tab 125 mg, 125 mg, Oral, BID    •  Levothyroxine Sodium (SYNTHROID) tab 100 mcg, 100 mcg, Oral, Before breakfast    •  escitalopram (LEXAPRO)

## 2020-10-28 NOTE — PROGRESS NOTES
[facility]    Progress Note    Damon Leyva Patient Status:  Inpatient    1933 MRN [id number]   Location [facility] 4W/SW/SE Attending Fredi Stovall, *   Hosp Day # 5 PCP Cara Harrison MD       SUBJECT Intravenous, Continuous    •  acetaminophen (TYLENOL) tab 650 mg, 650 mg, Oral, Q6H PRN    •  divalproex Sodium (DEPAKOTE) DR tab 125 mg, 125 mg, Oral, BID    •  Levothyroxine Sodium (SYNTHROID) tab 100 mcg, 100 mcg, Oral, Before breakfast    •  escitalopr

## 2020-10-28 NOTE — WOUND PROGRESS NOTE
WOUND CARE NOTE    History:  Past Medical History:   Diagnosis Date   • Acute cystitis    • Arthritis    • Atherosclerosis of coronary artery    • Breast cancer [facility])    • Breast lump     lumpectomy   • Cataract     extraction   • COPD (chronic obstructi Services to assist with discharge planning, the pt.  is from [facility] [address] new sacrum and diane eldridge for air mattress      ASSESSMENT   Forrest Score:  Forrest Scale Score: 11    Chart Reviewed: yes

## 2020-10-29 VITALS
BODY MASS INDEX: 25.11 KG/M2 | DIASTOLIC BLOOD PRESSURE: 81 MMHG | HEIGHT: 67 IN | WEIGHT: 160 LBS | RESPIRATION RATE: 18 BRPM | TEMPERATURE: 100 F | OXYGEN SATURATION: 98 % | HEART RATE: 79 BPM | SYSTOLIC BLOOD PRESSURE: 127 MMHG

## 2020-10-29 PROCEDURE — 84132 ASSAY OF SERUM POTASSIUM: CPT | Performed by: INTERNAL MEDICINE

## 2020-10-29 PROCEDURE — 85027 COMPLETE CBC AUTOMATED: CPT | Performed by: INTERNAL MEDICINE

## 2020-10-29 RX ORDER — AMOXICILLIN AND CLAVULANATE POTASSIUM 875; 125 MG/1; MG/1
1 TABLET, FILM COATED ORAL 2 TIMES DAILY
Qty: 8 TABLET | Refills: 0 | Status: SHIPPED | OUTPATIENT
Start: 2020-10-29 | End: 2020-11-02

## 2020-10-29 RX ORDER — ENOXAPARIN SODIUM 100 MG/ML
40 INJECTION SUBCUTANEOUS DAILY
Qty: 0.4 ML | Refills: 0 | Status: SHIPPED | OUTPATIENT
Start: 2020-10-30

## 2020-10-29 NOTE — PROGRESS NOTES
[facility]    Progress Note    Ines Ann Patient Status:  Inpatient    1933 MRN [id number]   Location [facility] 4W/SW/SE Attending Rashard Lomeli, *   Hosp Day # 6 PCP Gracie López MD       SUBJECT (TYLENOL) tab 650 mg, 650 mg, Oral, Q4H PRN    •  0.9% NaCl infusion, , Intravenous, Continuous    •  acetaminophen (TYLENOL) tab 650 mg, 650 mg, Oral, Q6H PRN    •  divalproex Sodium (DEPAKOTE) DR tab 125 mg, 125 mg, Oral, BID    •  Levothyroxine Sodium ( 135[address]  Tel: [phone]  Fax: [phone]  Web: [email or web address]

## 2020-10-29 NOTE — PLAN OF CARE
Patient sleeping comfortably in bed. Denies pain this shift. Call light within reach. Bed low and locked. Lovenox for DVT ppx. Tolerating diet. Crushing meds in applesauce. No ambulation this shift. Aquacel to sacrum changed. Fragoso maintained.  Zosyn and IV limitations  - Instruct pt to call for assistance with activity based on assessment  - Modify environment to reduce risk of injury  - Provide assistive devices as appropriate  - Consider OT/PT consult to assist with strengthening/mobility  - Encourage [facility] Case Management Department for coordinating discharge planning if the patient needs post-hospital services based on physician/LIP order or complex needs related to functional status, cognitive ability or social support system  Outcome: Progressing     Prob

## 2020-10-30 NOTE — PROGRESS NOTES
Patient resting in bed, alert and oriented to self. No complaints of pain. Vitals stable. Repositioned Q2. Air mattress. Fragoso removed. Continues with IV fluids and IV ABx. Isolation maintained. Report called to [employer]. [facility].  No distres

## 2020-10-30 NOTE — PAYOR COMM NOTE
--------------  DISCHARGE REVIEW    Payor: [address] [place] [address] #:  [id number]  Authorization Number: [id number]    Admit date: 10/23/20  Admit time:  0912  Discharge Date: 10/29/2020  7:00 PM     Admitting Physician: Eli Baig

## 2020-10-30 NOTE — PROGRESS NOTES
Spoke with Dr GRAHAM about depakote 500 mg TID and depakote 125 mg BID, per him, he will address tomorrow but for tonight give the depakote 125 mg. This info was relayed to Tosha.

## 2020-11-30 NOTE — DISCHARGE SUMMARY
[facility]    Discharge Summary    Vinaytamiko Sukumar Patient Status:  Inpatient    1933 MRN [id number]   Location [facility] 4W/SW/SE Attending No att. providers found   [facility] Day # 6 PCP Joseph Vaughn MD     Date of monitor     Hypernatremia  –Resolved     Suspected left facial weakness  –No significant weakness was noted  –CT head is unremarkable  –MRI of the brain unremarkable for acute events     Right heel pressure ulcer stage I–POA   Sacral deep tissue injury sta HCl 2 MG Caps  Commonly known as: IMODIUM      Take 2 mg by mouth 4 (four) times daily as needed for Diarrhea. Refills: 0     Metoprolol Tartrate 50 MG Tabs  Commonly known as: LOPRESSOR      Take 50 mg by mouth 2 (two) times daily.    Refills: 0     Pant

## (undated) NOTE — ED AVS SNAPSHOT
Paulino Merida   MRN: [id number]    Department:  [facility] Emergency Department   Date of Visit:  6/13/2018           Disclosure     Insurance plans vary and the physician(s) referred by the ER may not be covered by your plan.  Please contact within the next three months to obtain basic health screening including reassessment of your blood pressure.     IF THERE IS ANY CHANGE OR WORSENING OF YOUR CONDITION, CALL YOUR PRIMARY CARE PHYSICIAN AT ONCE OR RETURN IMMEDIATELY TO THE EMERGENCY DEPARTMEN

## (undated) NOTE — MR AVS SNAPSHOT
[address] [facility]  [address] [place] [address] [phone]               Thank you for choosing us for your health care visit with Ben Lane MD.  We are glad to serve you and happy to provide you with this summary of your visit. Clostridium difficile(toxigenic)PCR    Complete by:  Jan 23, 2017 (Approximate)    Assoc Dx:  Diarrhea, unspecified type [R19.7]                 [facility]     Sign up for [facility], your secure online medical record.   [facility] will allow you to access patient [email or web address]

## (undated) NOTE — ED AVS SNAPSHOT
Darío Morales   MRN: [id number]    Department:  [facility] Emergency Department   Date of Visit:  10/25/2018           Disclosure     Insurance plans vary and the physician(s) referred by the ER may not be covered by your plan.  Please contac within the next three months to obtain basic health screening including reassessment of your blood pressure.     IF THERE IS ANY CHANGE OR WORSENING OF YOUR CONDITION, CALL YOUR PRIMARY CARE PHYSICIAN AT ONCE OR RETURN IMMEDIATELY TO THE EMERGENCY DEPARTMEN

## (undated) NOTE — ED AVS SNAPSHOT
Faby Darells   MRN: [id number]    Department:  [facility] Emergency Department   Date of Visit:  8/11/2018           Disclosure     Insurance plans vary and the physician(s) referred by the ER may not be covered by your plan.  Please contact within the next three months to obtain basic health screening including reassessment of your blood pressure.     IF THERE IS ANY CHANGE OR WORSENING OF YOUR CONDITION, CALL YOUR PRIMARY CARE PHYSICIAN AT ONCE OR RETURN IMMEDIATELY TO THE EMERGENCY DEPARTMEN

## (undated) NOTE — IP AVS SNAPSHOT
Patient Demographics     Address  [address] [place] [address]  Malvin Kurtz [address] Phone  [phone] [facility])  [phone] (Mobile) *Preferred*      Emergency Contact(s)     Name Relation Home Work Mobile    PaulinoHaidera Daughter [phone]      Luis Miguel Márquez Commonly known as: DEPAKOTE  Next dose due: tonight      Take 125 mg by mouth 2 (two) times daily.           Enoxaparin Sodium 40 MG/0.4ML Soln  Commonly known as: LOVENOX  Start taking on: October 30, 2020  Next dose due: tomorrow      Inject 0.4 mL (40 mg [id number] Metoprolol Tartrate (LOPRESSOR) tab 50 mg 10/29/20 0835 Given      [id number] Pantoprazole Sodium (PROTONIX) EC tab 40 mg 10/29/20 0539 Given      [id number] Piperacillin Sod-Tazobactam So (ZOSYN) 3.375 g in dextrose 5 % 100 mL ADD-vantage 10/29/2 CBC, PLATELET; NO DIFFERENTIAL [[id number]] (Abnormal)  Resulted: 10/29/20 0950, Result status: Final result   Ordering provider: Vic Farr MD  10/28/20 2300 Resulting lab: [facility])    Specimen Information Emergency MRSA Screen by PCR Once [[id number]]  (Normal) Collected: 10/23/20 0819    Order Status: Completed Lab Status: Final result Updated: 10/23/20 0936    Specimen: Other from [facility]      MRSA Screen By PCR Negative    Rapid SARS-CoV-2 by PCR STAT [820 lumpectomy   • Cataract     extraction   • COPD (chronic obstructive pulmonary disease) (HCC)    • Dementia (HCC)    • Depressive disorder, not elsewhere classified    • [facility] ([facility]    • Esophageal reflux    • Essential hypertension    • Hx of diseases •  LIPITOR 20 MG OR TABS, Take 10 mg by mouth. •  Albuterol Sulfate 2 MG Oral Tab, Take 2 mg by mouth 3 (three) times daily. •  Loperamide HCl 2 MG Oral Cap, Take 2 mg by mouth 4 (four) times daily as needed for Diarrhea.     •  Metoprolol Succinate CONCLUSION:  1. No acute intracranial process. 2.  There are mild microvascular white matter ischemic changes, likely related to long-standing hypertension and/or diabetes. 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  3.  Atherosclerosis in the anteri ECG Report  Interpretation  -------------------------- Sinus Tachycardia -First degree A-V block Kathie = 242 Low voltage in precordial leads.  -Incomplete right bundle branch block and left axis -anterior fascicular block.  -Left atrial enlargement.  -Old in No notes of this type exist for this encounter. Occupational Therapy Notes (last 72 hours) (Notes from 10/26/2020  6:35 PM through 10/29/2020  6:35 PM)    No notes of this type exist for this encounter.      Video Swallow Study Notes    No notes of this Pt seen for swallowing therapy after being positioned upright to 90 degrees in bed. Pt is alert, afebrile, tolerating room air on 2L NC with oxygen status 91-92% prior to the start of oral trials.  Continued educations on aspiration precautions and safe swa Compensatory Strategies Recommended: Small bites and sips; Slow rate;Pinched straw;Feed pt  Aspiration Precautions: Upright position; Slow rate;Small bites and sips  Medication Administration Recommendations: Crushed in puree    Patient Experiencing Pain: No EXT. 91383[SP.1]      Electronically signed by Marilou Lancaster, SLP on 10/26/2020  1:42 PM   Attribution Chase    GUILHERME Nguyen on 10/26/2020  1:21 PM                     Multidisciplinary Problems     Active Goals        Problem: Patient/Fam